# Patient Record
Sex: FEMALE | Race: WHITE
[De-identification: names, ages, dates, MRNs, and addresses within clinical notes are randomized per-mention and may not be internally consistent; named-entity substitution may affect disease eponyms.]

---

## 2017-04-10 ENCOUNTER — HOSPITAL ENCOUNTER (OUTPATIENT)
Dept: HOSPITAL 61 - PCVCCLINIC | Age: 72
Discharge: HOME | End: 2017-04-10
Attending: INTERNAL MEDICINE
Payer: MEDICARE

## 2017-04-10 DIAGNOSIS — E11.9: ICD-10-CM

## 2017-04-10 DIAGNOSIS — E78.5: ICD-10-CM

## 2017-04-10 DIAGNOSIS — I10: ICD-10-CM

## 2017-04-10 DIAGNOSIS — G47.30: ICD-10-CM

## 2017-04-10 DIAGNOSIS — Z94.0: ICD-10-CM

## 2017-04-10 DIAGNOSIS — Z95.0: ICD-10-CM

## 2017-04-10 DIAGNOSIS — I25.10: Primary | ICD-10-CM

## 2017-04-10 PROCEDURE — G0463 HOSPITAL OUTPT CLINIC VISIT: HCPCS

## 2017-04-10 PROCEDURE — 93005 ELECTROCARDIOGRAM TRACING: CPT

## 2017-04-10 PROCEDURE — 80061 LIPID PANEL: CPT

## 2018-05-02 ENCOUNTER — HOSPITAL ENCOUNTER (OUTPATIENT)
Dept: HOSPITAL 61 - PCVCCLINIC | Age: 73
Discharge: HOME | End: 2018-05-02
Attending: INTERNAL MEDICINE
Payer: MEDICARE

## 2018-05-02 DIAGNOSIS — Z79.82: ICD-10-CM

## 2018-05-02 DIAGNOSIS — E78.5: ICD-10-CM

## 2018-05-02 DIAGNOSIS — Z88.0: ICD-10-CM

## 2018-05-02 DIAGNOSIS — Z79.899: ICD-10-CM

## 2018-05-02 DIAGNOSIS — I10: ICD-10-CM

## 2018-05-02 DIAGNOSIS — R94.31: ICD-10-CM

## 2018-05-02 DIAGNOSIS — Z95.0: ICD-10-CM

## 2018-05-02 DIAGNOSIS — Z94.0: ICD-10-CM

## 2018-05-02 DIAGNOSIS — E11.9: ICD-10-CM

## 2018-05-02 DIAGNOSIS — I25.10: Primary | ICD-10-CM

## 2018-05-02 DIAGNOSIS — Z87.891: ICD-10-CM

## 2018-05-02 DIAGNOSIS — Z79.4: ICD-10-CM

## 2018-05-02 PROCEDURE — 93005 ELECTROCARDIOGRAM TRACING: CPT

## 2018-05-02 PROCEDURE — 80061 LIPID PANEL: CPT

## 2018-08-30 ENCOUNTER — HOSPITAL ENCOUNTER (OUTPATIENT)
Dept: HOSPITAL 61 - PCVCCLINIC | Age: 73
Discharge: HOME | End: 2018-08-30
Attending: INTERNAL MEDICINE
Payer: MEDICARE

## 2018-08-30 DIAGNOSIS — E11.22: ICD-10-CM

## 2018-08-30 DIAGNOSIS — N18.4: ICD-10-CM

## 2018-08-30 DIAGNOSIS — I25.10: Primary | ICD-10-CM

## 2018-08-30 DIAGNOSIS — Z79.4: ICD-10-CM

## 2018-08-30 DIAGNOSIS — Z87.891: ICD-10-CM

## 2018-08-30 DIAGNOSIS — E78.5: ICD-10-CM

## 2018-08-30 DIAGNOSIS — I25.5: ICD-10-CM

## 2018-08-30 DIAGNOSIS — I50.22: ICD-10-CM

## 2018-08-30 DIAGNOSIS — I11.0: ICD-10-CM

## 2018-08-30 DIAGNOSIS — Z79.82: ICD-10-CM

## 2018-08-30 DIAGNOSIS — Z88.0: ICD-10-CM

## 2018-08-30 DIAGNOSIS — I13.0: ICD-10-CM

## 2018-08-30 DIAGNOSIS — Z79.899: ICD-10-CM

## 2018-08-30 PROCEDURE — 93005 ELECTROCARDIOGRAM TRACING: CPT

## 2018-08-30 PROCEDURE — G0463 HOSPITAL OUTPT CLINIC VISIT: HCPCS

## 2018-10-31 ENCOUNTER — HOSPITAL ENCOUNTER (OUTPATIENT)
Dept: HOSPITAL 61 - PCVCIMAG | Age: 73
Discharge: HOME | End: 2018-10-31
Attending: INTERNAL MEDICINE
Payer: MEDICARE

## 2018-10-31 DIAGNOSIS — Z95.0: ICD-10-CM

## 2018-10-31 DIAGNOSIS — Z79.4: ICD-10-CM

## 2018-10-31 DIAGNOSIS — Z94.0: ICD-10-CM

## 2018-10-31 DIAGNOSIS — I08.3: Primary | ICD-10-CM

## 2018-10-31 DIAGNOSIS — I10: ICD-10-CM

## 2018-10-31 DIAGNOSIS — I50.22: ICD-10-CM

## 2018-10-31 DIAGNOSIS — E78.5: ICD-10-CM

## 2018-10-31 DIAGNOSIS — N18.3: ICD-10-CM

## 2018-10-31 DIAGNOSIS — E11.9: ICD-10-CM

## 2018-10-31 DIAGNOSIS — I13.0: ICD-10-CM

## 2018-10-31 DIAGNOSIS — I25.10: ICD-10-CM

## 2018-10-31 DIAGNOSIS — Z79.82: ICD-10-CM

## 2018-10-31 DIAGNOSIS — E78.00: ICD-10-CM

## 2018-10-31 DIAGNOSIS — I25.5: ICD-10-CM

## 2018-10-31 PROCEDURE — 93005 ELECTROCARDIOGRAM TRACING: CPT

## 2018-10-31 PROCEDURE — 80061 LIPID PANEL: CPT

## 2018-10-31 PROCEDURE — G0463 HOSPITAL OUTPT CLINIC VISIT: HCPCS

## 2018-10-31 PROCEDURE — 93306 TTE W/DOPPLER COMPLETE: CPT

## 2018-10-31 NOTE — PCVCIMAG
--------------- APPROVED REPORT --------------





Study performed:  10/31/2018 08:43:31



EXAM: Comprehensive 2D, Doppler, and color-flow 

Echocardiogram

Patient Location: Echo lab

Room #:  2Status:  routine



BSA:         1.90

HR: 75 bpmBP:          114/64 mmHg

Rhythm: NSR



Other Information 

Study Quality: Good



Risk Factors: 

Cardiac Risk Factors:  DM



Indications

Congestive Heart 

Failure

Diabetes

CAD

Cardiomyopathy

Hypertension/HDD



2D Dimensions

IVSd:  8.29 (7-11mm)LVOT Diam:  21.81 (18-24mm) 

LVDd:  53.54 mm

PWd:  8.90 (7-11mm)Ascending Ao:  36.08 (22-36mm)

LVDs:  37.44 (25-40mm)

Left Atrium:  38.87 (27-40mm)

Aortic Root:  26.95 mm

LV Single Plane 4CH:  53.17 %

LV Single Plane 2CH:  52.85 %

Biplane EF:  52.4 %



Volumes

Left Atrial Volume (Systole)

Single Plane 4CH:  89.38 mLSingle Plane 2CH:  60.28 mL

Biplane LA Volume:  74.00 mLLA ESV Index:  39.00 mL/m2



Aortic Valve

AoV Peak Enmanuel.:  1.11 m/s

AO Peak Gr.:  5.39 mmHgLVOT Max PG:  3.70 mmHg

LVOT Max V:  0.96 m/s

JEN Vmax: 3.24 cm2

AI Vmax:  3.62 m/s

AI [place]:  2.43 m/s2

AI PHT:  432.37 ms



Mitral Valve

E/A Ratio:  1.8

MV Decel. Time:  161.73 ms

MV E Max Enmanuel.:  1.04 m/s

MV A Enmanuel.:  0.58 m/s

IVRT:  38.06 ms



TDI

E/Lateral E':  20.80E/Medial E':  20.80

Medial E' Enmanuel.:  0.05 m/s

Lateral E' Enmanuel.:  0.05 m/s



Pulmonary Valve

PV Peak Enmanuel.:  0.79 m/sPV Peak Gr.:  2.51 mmHg



Pulmonary Vein

P Vein S:    0.34 m/sP Vein A:  0.26 m/s

P Vein D:   0.87 m/sP Vein A Dur.:  72.7 msec

P Vein S/D Ratio:  0.39



Tricuspid Valve

TR Peak Enmanuel.:  3.35 m/s

TR Peak Gr.:  44.97 mmHg

TV Vmax:  0.50 m/sPA Pressure:  52.00 mmHg



Left Ventricle

The left ventricle is normal size. Septal and anterior wall 

hypokinesis There is normal left ventricular wall thickness. Left 

ventricular systolic function is moderately decreased. LVEF 35-40%. 

Grade I - abnormal relaxation pattern. Left atrial pressure is 

elevated.



Right Ventricle

The right ventricle is normal size. The right ventricular systolic 

function is normal.



Atria

Left atrium is mildly dilated. Pacemaker lead is present in the right 

atrium.



Aortic Valve

Aortic valve is trileaflet, mildly sclerotic. Mild regurgitation. 

There is no aortic valvular stenosis.



Mitral Valve

The mitral valve is normal in structure. Moderate mitral 

regurgitation. No evidence of mitral valve stenosis.



Tricuspid Valve

The tricuspid valve is normal in structure. Mild to moderate 

tricuspid regurgitation with a PA pressure of 50 mmHg. Moderate 

pulmonary hypertension.



Pulmonic Valve

The pulmonary valve is normal in structure. There is no pulmonic 

valvular regurgitation.



Great Vessels

The aortic root is normal in size. The ascending aorta is normal in 

size. Aortic arch is normal in caliber. IVC is normal in size and 

collapses &gt;50% with inspiration.



Pericardium

There is no pericardial effusion. There is no pleural 

effusion.



&lt;Conclusion&gt;

Left ventricular systolic function is moderately decreased.

Septal and anterior wall hypokinesis

LVEF 35-40%.

Both atria are dilated.  Pacer in right heart

Aortic valve is trileaflet,  mildly sclerotic. Mild regurgitation, no 

stenosis.

The mitral valve is normal in structure. Moderate mitral 

regurgitation.

Mild to moderate tricuspid regurgitation with a pulmonary artery 

pressure of 50 mmHg.

There is no pericardial effusion.